# Patient Record
Sex: MALE | Race: NATIVE HAWAIIAN OR OTHER PACIFIC ISLANDER | HISPANIC OR LATINO | URBAN - METROPOLITAN AREA
[De-identification: names, ages, dates, MRNs, and addresses within clinical notes are randomized per-mention and may not be internally consistent; named-entity substitution may affect disease eponyms.]

---

## 2023-03-01 ENCOUNTER — EMERGENCY (EMERGENCY)
Facility: HOSPITAL | Age: 28
LOS: 1 days | Discharge: HOME | End: 2023-03-01
Attending: EMERGENCY MEDICINE
Payer: MEDICAID

## 2023-03-01 VITALS
HEART RATE: 72 BPM | SYSTOLIC BLOOD PRESSURE: 153 MMHG | OXYGEN SATURATION: 99 % | WEIGHT: 199.96 LBS | TEMPERATURE: 100 F | DIASTOLIC BLOOD PRESSURE: 87 MMHG | HEIGHT: 68 IN | RESPIRATION RATE: 20 BRPM

## 2023-03-01 LAB
FLUBV RNA SPEC QL NAA+PROBE: DETECTED
RAPID RVP RESULT: DETECTED
SARS-COV-2 RNA SPEC QL NAA+PROBE: SIGNIFICANT CHANGE UP

## 2023-03-01 PROCEDURE — 99284 EMERGENCY DEPT VISIT MOD MDM: CPT

## 2023-03-01 PROCEDURE — 93005 ELECTROCARDIOGRAM TRACING: CPT

## 2023-03-01 PROCEDURE — 0225U NFCT DS DNA&RNA 21 SARSCOV2: CPT

## 2023-03-01 PROCEDURE — 96372 THER/PROPH/DIAG INJ SC/IM: CPT

## 2023-03-01 PROCEDURE — 71045 X-RAY EXAM CHEST 1 VIEW: CPT

## 2023-03-01 PROCEDURE — 71045 X-RAY EXAM CHEST 1 VIEW: CPT | Mod: 26

## 2023-03-01 PROCEDURE — 99285 EMERGENCY DEPT VISIT HI MDM: CPT | Mod: 25

## 2023-03-01 RX ORDER — KETOROLAC TROMETHAMINE 30 MG/ML
15 SYRINGE (ML) INJECTION ONCE
Refills: 0 | Status: DISCONTINUED | OUTPATIENT
Start: 2023-03-01 | End: 2023-03-01

## 2023-03-01 RX ORDER — ACETAMINOPHEN 500 MG
650 TABLET ORAL ONCE
Refills: 0 | Status: COMPLETED | OUTPATIENT
Start: 2023-03-01 | End: 2023-03-01

## 2023-03-01 RX ADMIN — Medication 650 MILLIGRAM(S): at 12:41

## 2023-03-01 RX ADMIN — Medication 15 MILLIGRAM(S): at 12:41

## 2023-03-01 NOTE — ED PROVIDER NOTE - OBJECTIVE STATEMENT
27M no PMH, presents with 1 day of low grade fevers, cough, parsternal chest pain when he coughs associated with some sob. Denies any uri sxs, palpitations, lightheadedness, exertional cp/diaphoresis, abd pain, n/v/d, urinary sxs, leg swelling. Didn't take antipyretics pta.    Pt daily marijuana smoker, not a tobacco smoker. 27M no PMH, presents with 1 day of low grade fevers, cough, parsternal chest pain when he coughs associated with some sob. Denies any uri sxs, palpitations, lightheadedness, exertional cp/diaphoresis, abd pain, n/v/d, urinary sxs, leg swelling. Didn't take antipyretics pta.    Pt daily marijuana smoker, not a tobacco smoker.   ID: [id number]

## 2023-03-01 NOTE — ED PROVIDER NOTE - PATIENT PORTAL LINK FT
You can access the FollowMyHealth Patient Portal offered by [facility] by registering at the following website: [email or web address]. By joining [employer]’s FollowMyHealth portal, you will also be able to view your health information using other applications (apps) compatible with our system.

## 2023-03-01 NOTE — ED PROVIDER NOTE - PHYSICAL EXAMINATION
GENERAL: no acute distress, non-toxic appearing  HEENT: normal conjunctiva, oral mucosa moist  CARDIAC: regular rate and regular rhythm, bp reassuring  PULM: clear to ascultation bilaterally, no appreciable crackles, rales, rhonchi, or wheezing, sats 100% on RA, no increased work of breathing  GI: abdomen nondistended, soft, nontender  : no suprapubic tenderness  NEURO: alert and oriented x 3, normal speech, moving all extremities without lateralization  MSK: no visible deformities, no peripheral edema, calf tenderness/redness/swelling  SKIN: no visible rashes  PSYCH: appropriate mood and affect

## 2023-03-01 NOTE — ED ADULT NURSE NOTE - OBJECTIVE STATEMENT
healthy 27 yr old came in woth fevers cough and gets chest pain when he coughs. on assessment  a and o x 3 lungs clear bad soft non tender no swelling in extremities no n/v/d no fevers no other complaints.

## 2023-03-01 NOTE — ED PROVIDER NOTE - NSFOLLOWUPINSTRUCTIONS_ED_ALL_ED_FT
- Please follow up with your Primary Care Doctor within 1 week. Bring your results from today.    - Tylenol up to 650 mg every 6 hours as needed for pain and/or Ibuprofen up to 600 mg every 6 hours as needed for pain.    - Stay well hydrated.    - Be sure to return to the ED if you develop new, worsening, or any distressing symptoms.

## 2023-03-01 NOTE — ED PROVIDER NOTE - NSFOLLOWUPCLINICS_GEN_ALL_ED_FT
[facility]  Cardiology  [address] [place] [address]  Phone: [phone]  Fax:   Follow Up Time: 7-10 Days

## 2023-03-01 NOTE — ED PROVIDER NOTE - CLINICAL SUMMARY MEDICAL DECISION MAKING FREE TEXT BOX
Zambratto, Med Tox Fellow: likely costochondritis/msk related given pt has parasternal pain with coughing, low grade temps since yesterday. Low suspicion acs/PE (perc's out) Overall looks well, vitals reassuring, clear lungs not working to breathe. Will do cxr/ekg/analgesia/rvp. Anticipate Dc. Zambratto, Med Tox Fellow: likely costochondritis/msk related given pt has parasternal pain with coughing, low grade temps since yesterday. Low suspicion acs/PE (perc's out) Overall looks well, vitals reassuring, clear lungs not working to breathe. Will do cxr/ekg/analgesia/rvp. Anticipate Dc.    Attending Statement: Agree with the above.  Nontoxic, HDS, well appearing, vss, EKG without ecotpy or LVH.  Plan as above; no concern for acute coronary syndrome or viral myocarditis.  Should be stable for d/c presuming non-actionable workup or clinical course.  --BMM

## 2023-03-02 NOTE — ED POST DISCHARGE NOTE - DETAILS
pt's number is not correct. informed by emergency contact patient's correct number is [phone]. Called and spoke with patient, informed of results and advised symptomatic treatment. - Skye Baca PA-C

## 2023-05-07 ENCOUNTER — EMERGENCY (EMERGENCY)
Facility: HOSPITAL | Age: 28
LOS: 1 days | Discharge: HOME | End: 2023-05-07
Attending: STUDENT IN AN ORGANIZED HEALTH CARE EDUCATION/TRAINING PROGRAM
Payer: MEDICAID

## 2023-05-07 VITALS
RESPIRATION RATE: 20 BRPM | OXYGEN SATURATION: 98 % | SYSTOLIC BLOOD PRESSURE: 155 MMHG | HEART RATE: 72 BPM | WEIGHT: 199.96 LBS | TEMPERATURE: 98 F | DIASTOLIC BLOOD PRESSURE: 80 MMHG

## 2023-05-07 PROCEDURE — 99284 EMERGENCY DEPT VISIT MOD MDM: CPT

## 2023-05-07 PROCEDURE — 99283 EMERGENCY DEPT VISIT LOW MDM: CPT

## 2023-05-07 RX ORDER — CYCLOBENZAPRINE HYDROCHLORIDE 10 MG/1
1 TABLET, FILM COATED ORAL
Qty: 21 | Refills: 0
Start: 2023-05-07 | End: 2023-05-13

## 2023-05-07 RX ORDER — CYCLOBENZAPRINE HYDROCHLORIDE 10 MG/1
5 TABLET, FILM COATED ORAL ONCE
Refills: 0 | Status: COMPLETED | OUTPATIENT
Start: 2023-05-07 | End: 2023-05-07

## 2023-05-07 RX ORDER — LIDOCAINE 4 G/100G
1 CREAM TOPICAL ONCE
Refills: 0 | Status: COMPLETED | OUTPATIENT
Start: 2023-05-07 | End: 2023-05-07

## 2023-05-07 RX ORDER — ACETAMINOPHEN 500 MG
650 TABLET ORAL ONCE
Refills: 0 | Status: COMPLETED | OUTPATIENT
Start: 2023-05-07 | End: 2023-05-07

## 2023-05-07 RX ORDER — IBUPROFEN 200 MG
600 TABLET ORAL ONCE
Refills: 0 | Status: COMPLETED | OUTPATIENT
Start: 2023-05-07 | End: 2023-05-07

## 2023-05-07 RX ADMIN — Medication 650 MILLIGRAM(S): at 14:57

## 2023-05-07 RX ADMIN — Medication 600 MILLIGRAM(S): at 14:57

## 2023-05-07 RX ADMIN — LIDOCAINE 1 PATCH: 4 CREAM TOPICAL at 15:03

## 2023-05-07 RX ADMIN — CYCLOBENZAPRINE HYDROCHLORIDE 5 MILLIGRAM(S): 10 TABLET, FILM COATED ORAL at 14:57

## 2023-05-07 NOTE — ED PROVIDER NOTE - PHYSICAL EXAMINATION
GENERAL: Vital signs are within normal limits  EYES: Conjunctiva noninjected or pale, sclera anicteric  HENT: NC/AT, moist mucous membranes  NECK: Supple, trachea midline. no midline C spine ttp. mild R sided cervical paraspinal ttp  LUNG: Nonlabored respirations, no wheezes, rales  CV: RRR, Pulses- Radial/dorsalis pedis: 2+ bilateral and equal  ABDOMEN: Nondistended, nontender  MSK: No visible deformities, nontender extremities  SKIN: No rashes, bruises  NEURO: AAOx4 (to person, place, time, event), no tremor, steady gait  PSYCH: Normal mood and affect

## 2023-05-07 NOTE — ED PROVIDER NOTE - NSFOLLOWUPINSTRUCTIONS_ED_ALL_ED_FT
You were seen in the Emergency Department for neck/upper back pain. Your pain improved with medication including flexeril (which was sent to your pharmacy), tylenol, ibuprofen and a lidocaine patch (which you can purchase over the counter). Return to the ED for worsening or other concerning symptoms.     1) Advance activity as tolerated.   2) Continue all previously prescribed medications as directed.    3) Follow up with your primary care physician in 24-48 hours - take copies of your results.    4) Return to the Emergency Department for worsening or persistent symptoms, and/or ANY NEW OR CONCERNING SYMPTOMS.

## 2023-05-07 NOTE — ED ADULT NURSE NOTE - OBJECTIVE STATEMENT
27M aaox4 ambulatory with no PMHx, works in a [employer]i, lifting heavy things at work, c/o pain in the rt side of the neck that spreads today in the rt shoulder and scapular area. Patient denies any trauma. Patient reports pain has been ongoing for 2 days now. Took motrin and Meloxicam yesterday without relief. On exam, no rashes or redness noted, warm pack helps with the pain a little but still feeling stiffed. VS WDL. No chills or fever, nausea, vomitig or abd pain.

## 2023-05-07 NOTE — ED ADULT TRIAGE NOTE - HISTORY OF COVID-19 VACCINATION
Met with patient to discuss transitional planning. Surgical workup underway. Discussed goal for DC, lives with daughter and family, but she and her  work. Discussed possible short term rehab.   Patient agreeable, given freedom of choice, states he's been to [facility] in the past, is agreeable to referral, which I  have faxed    [phone] spoke with Cortney Aguayo at [facility], they are reviewing referral.  I let her know patient is being worked up for possible CABG Yes

## 2023-05-07 NOTE — ED PROVIDER NOTE - PATIENT PORTAL LINK FT
You can access the FollowMyHealth Patient Portal offered by [facility] by registering at the following website: [email or web address]. By joining [employer]’s FollowMyHealth portal, you will also be able to view your health information using other applications (apps) compatible with our system.

## 2023-05-07 NOTE — ED PROVIDER NOTE - OBJECTIVE STATEMENT
26 yo M no PMH presenting with R neck pain. States he noted pain yesterday and it worsening this morning. Denies hx of similar pain. Denies fevers, chills, neck stiffness, HA, lightheadedness, dizziness, n/v, trauma to area. Denies heavy lifting or aggravating factor. States feels pain on R side of neck and occassionally feels sharp pain going towards shoulder and arm.   No visual changes, facial muscle weakness. No chest pain, sob, abd pain. No steroid use, hx IVDA. No PMD.

## 2023-05-07 NOTE — ED ADULT NURSE NOTE - EXTENSIONS OF SELF_ADULT
Please follow-up with PCP within 2 days for wound check.  Please take all medication even if start feel better.  Please return to the ER if redness extends past the marking that I placed on you today.  Please follow-up with red flag signs symptoms listed below    Return to er if new increased or worsening symptoms despite medications, fevers, chest pain, shortness of breath, altered mental status, inability to stay hydrated, rash, facial droop, arm weakness, slurred speech, redness or swelling that extends up and down your arm, inability to move arm, redness or swelling that it localized to 1 joint  
None

## 2023-05-07 NOTE — ED PROVIDER NOTE - CLINICAL SUMMARY MEDICAL DECISION MAKING FREE TEXT BOX
26 yo M no PMH presenting with R neck pain. States he noted pain yesterday and it worsening this morning. Denies hx of similar pain. Denies fevers, chills, neck stiffness, HA, lightheadedness, dizziness, n/v, trauma to area. Denies heavy lifting or aggravating factor. States feels pain on R side of neck and occasionally feels sharp pain going towards shoulder and arm.   No visual changes, facial muscle weakness. No chest pain, sob, abd pain. No steroid use, hx IVDA. No PMD.   On exam pt with normal ROM of neck, has no midline C spine ttp. Has normal distal pulses in upper extremities and strength and sensation is intact. Has ttp in paracervical spine on R side over muscle. No ttp over shoulder or swelling.   Likely muscular pain with spasm causing nerve irritation/radiculopathy. No sign of cord compression as no trauma and neurovascularly intact with normal strength/sensation and hand  intact. No paresthesias. No f/c, HA to suggest meningitis.   Will give motrin, tylenol, flexeril and lidocaine patch. Will reassess.

## 2023-05-07 NOTE — ED PROVIDER NOTE - PROGRESS NOTE DETAILS
Pain improved with medications. He is able to range neck more freely and without pain. Currently pain free. Will send home with flexeril and instructions to take tylenol and motrin prn for pain. Stable for dc with return precautions given.

## 2025-06-18 ENCOUNTER — EMERGENCY (EMERGENCY)
Facility: HOSPITAL | Age: 30
LOS: 1 days | End: 2025-06-18
Attending: EMERGENCY MEDICINE
Payer: MEDICAID

## 2025-06-18 VITALS
OXYGEN SATURATION: 96 % | HEART RATE: 74 BPM | DIASTOLIC BLOOD PRESSURE: 75 MMHG | SYSTOLIC BLOOD PRESSURE: 121 MMHG | TEMPERATURE: 98 F | RESPIRATION RATE: 16 BRPM

## 2025-06-18 VITALS
OXYGEN SATURATION: 99 % | TEMPERATURE: 99 F | DIASTOLIC BLOOD PRESSURE: 85 MMHG | RESPIRATION RATE: 17 BRPM | HEART RATE: 88 BPM | SYSTOLIC BLOOD PRESSURE: 154 MMHG

## 2025-06-18 PROCEDURE — 99284 EMERGENCY DEPT VISIT MOD MDM: CPT

## 2025-06-18 RX ORDER — CEPHALEXIN 250 MG/1
1 CAPSULE ORAL
Qty: 28 | Refills: 0
Start: 2025-06-18 | End: 2025-06-24

## 2025-06-18 RX ORDER — CEPHALEXIN 250 MG/1
500 CAPSULE ORAL ONCE
Refills: 0 | Status: COMPLETED | OUTPATIENT
Start: 2025-06-18 | End: 2025-06-18

## 2025-06-18 RX ADMIN — CEPHALEXIN 500 MILLIGRAM(S): 250 CAPSULE ORAL at 13:39

## 2025-07-15 PROCEDURE — 99283 EMERGENCY DEPT VISIT LOW MDM: CPT
